# Patient Record
Sex: FEMALE | Race: OTHER
[De-identification: names, ages, dates, MRNs, and addresses within clinical notes are randomized per-mention and may not be internally consistent; named-entity substitution may affect disease eponyms.]

---

## 2021-03-02 LAB
ADD MANUAL DIFF: NO
ANION GAP SERPL CALC-SCNC: 11 MMOL/L (ref 5–15)
APPEARANCE UR: CLEAR
APTT BLD: 27 SEC (ref 23–33)
APTT PPP: (no result) S
BASOPHILS NFR BLD AUTO: 1.7 % (ref 0–2)
BUN SERPL-MCNC: 12 MG/DL (ref 7–18)
CALCIUM SERPL-MCNC: 9.5 MG/DL (ref 8.5–10.1)
CHLORIDE SERPL-SCNC: 103 MMOL/L (ref 98–107)
CO2 SERPL-SCNC: 25 MMOL/L (ref 21–32)
CREAT SERPL-MCNC: 0.7 MG/DL (ref 0.55–1.3)
EOSINOPHIL NFR BLD AUTO: 5.6 % (ref 0–3)
ERYTHROCYTE [DISTWIDTH] IN BLOOD BY AUTOMATED COUNT: 14 % (ref 11.6–14.8)
GLUCOSE UR STRIP-MCNC: NEGATIVE MG/DL
HCT VFR BLD CALC: 37.6 % (ref 37–47)
HGB BLD-MCNC: 11.6 G/DL (ref 12–16)
INR PPP: 0.9 (ref 0.9–1.1)
KETONES UR QL STRIP: NEGATIVE
LEUKOCYTE ESTERASE UR QL STRIP: (no result)
LYMPHOCYTES NFR BLD AUTO: 25.6 % (ref 20–45)
MCV RBC AUTO: 84 FL (ref 80–99)
MONOCYTES NFR BLD AUTO: 10.5 % (ref 1–10)
NEUTROPHILS NFR BLD AUTO: 56.6 % (ref 45–75)
NITRITE UR QL STRIP: NEGATIVE
PH UR STRIP: 5 [PH] (ref 4.5–8)
PLATELET # BLD: 393 K/UL (ref 150–450)
POTASSIUM SERPL-SCNC: 4 MMOL/L (ref 3.5–5.1)
PROT UR QL STRIP: NEGATIVE
RBC # BLD AUTO: 4.46 M/UL (ref 4.2–5.4)
SODIUM SERPL-SCNC: 139 MMOL/L (ref 136–145)
SP GR UR STRIP: 1.02 (ref 1–1.03)
UROBILINOGEN UR-MCNC: NORMAL MG/DL (ref 0–1)
WBC # BLD AUTO: 8.1 K/UL (ref 4.8–10.8)

## 2021-03-03 NOTE — PRE-OP HX & PHY REPO 2 SIG
DATE OF ADMISSION:  2021

SCHEDULED FOR OUTPATIENT SURGERY:  2021.



HISTORY OF PRESENT ILLNESS:  The patient is a 46-year-old female in overall

good health with ductal carcinoma in situ of the left breast.  The patient

underwent imaging studies revealing 1 cm abnormal calcifications in the

upper outer quadrant of the left breast.  Core biopsy revealed ductal

carcinoma in situ on 2021.  Repeat core biopsy ,

revealed no malignancy, attempting to biopsy additional isolated

microcalcifications.  Subsequently, the patient underwent an MRI revealing

two biopsy clips, one at 12 o'clock, one at 2 o'clock in the left breast

upper outer quadrant 9 cm from the nipple, two abnormal areas adjacent.

The patient is scheduled to undergo left breast partial mastectomy with

preoperative stereotactic needle localization x2.



PAST MEDICAL HISTORY/MEDICATIONS:  In the past for toe fungus.



ALLERGIES:  None.



OPERATIONS:  Removal of cyst of back.



REVIEW OF SYSTEMS:  She is  1, para 1.  She has regular menstrual

periods.



PHYSICAL EXAMINATION:

GENERAL:  The patient is 5 feet 5 inches, 193 pounds.

VITAL SIGNS:  Within normal limits.

HEENT:  Within normal limits.

LUNGS:  Clear.

HEART:  Regular rhythm.

BREASTS:  Large and ptotic.  There is no palpable mass in either breast.

No primary or secondary sign of tumor.  No axillary or supraclavicular

lymphadenopathy.

ABDOMEN:  Soft.

PELVIC AND RECTAL:  Per primary care.

EXTREMITIES:  Without edema.

NEUROLOGIC:  Physiologic.



IMPRESSION:  Ductal carcinoma in situ, left breast, two adjacent abnormal

lesions.



PLAN:  Left breast partial mastectomy with preoperative needle localization x 2



DISCUSSION:  I have had a full discussion with the patient regarding the

nature of her condition, the nature of the surgery, indications,

alternatives, options, and risks including bleeding, infection, distortion

of the breast or nipple and need for additional treatments including

radiation to the breast and possible endocrine therapy and if invasive

carcinoma is found then she would need lymph node biopsy and possible

additional treatments.  She understands and agrees to proceed.









  ______________________________________________

  Austin Presley M.D. DR:  DEMOND/VICENTE

D:  2021 09:59

T:  2021 11:48

JOB#:  [id number]

CC:



CESAR

## 2021-03-05 ENCOUNTER — HOSPITAL ENCOUNTER (OUTPATIENT)
Dept: HOSPITAL 72 - SUR | Age: 46
Discharge: HOME | End: 2021-03-05
Payer: MEDICAID

## 2021-03-05 VITALS — SYSTOLIC BLOOD PRESSURE: 98 MMHG | DIASTOLIC BLOOD PRESSURE: 40 MMHG

## 2021-03-05 VITALS — DIASTOLIC BLOOD PRESSURE: 54 MMHG | SYSTOLIC BLOOD PRESSURE: 93 MMHG

## 2021-03-05 VITALS — SYSTOLIC BLOOD PRESSURE: 128 MMHG | DIASTOLIC BLOOD PRESSURE: 80 MMHG

## 2021-03-05 VITALS — DIASTOLIC BLOOD PRESSURE: 79 MMHG | SYSTOLIC BLOOD PRESSURE: 136 MMHG

## 2021-03-05 VITALS — SYSTOLIC BLOOD PRESSURE: 99 MMHG | DIASTOLIC BLOOD PRESSURE: 43 MMHG

## 2021-03-05 VITALS — SYSTOLIC BLOOD PRESSURE: 129 MMHG | DIASTOLIC BLOOD PRESSURE: 76 MMHG

## 2021-03-05 VITALS — SYSTOLIC BLOOD PRESSURE: 91 MMHG | DIASTOLIC BLOOD PRESSURE: 44 MMHG

## 2021-03-05 VITALS — BODY MASS INDEX: 34.2 KG/M2 | WEIGHT: 193 LBS | HEIGHT: 63 IN

## 2021-03-05 VITALS — SYSTOLIC BLOOD PRESSURE: 131 MMHG | DIASTOLIC BLOOD PRESSURE: 72 MMHG

## 2021-03-05 VITALS — DIASTOLIC BLOOD PRESSURE: 77 MMHG | SYSTOLIC BLOOD PRESSURE: 132 MMHG

## 2021-03-05 VITALS — SYSTOLIC BLOOD PRESSURE: 122 MMHG | DIASTOLIC BLOOD PRESSURE: 74 MMHG

## 2021-03-05 VITALS — DIASTOLIC BLOOD PRESSURE: 60 MMHG | SYSTOLIC BLOOD PRESSURE: 106 MMHG

## 2021-03-05 VITALS — SYSTOLIC BLOOD PRESSURE: 118 MMHG | DIASTOLIC BLOOD PRESSURE: 68 MMHG

## 2021-03-05 DIAGNOSIS — E66.9: ICD-10-CM

## 2021-03-05 DIAGNOSIS — D05.12: Primary | ICD-10-CM

## 2021-03-05 PROCEDURE — 19301 PARTIAL MASTECTOMY: CPT

## 2021-03-05 PROCEDURE — 94150 VITAL CAPACITY TEST: CPT

## 2021-03-05 PROCEDURE — 81025 URINE PREGNANCY TEST: CPT

## 2021-03-05 PROCEDURE — 94003 VENT MGMT INPAT SUBQ DAY: CPT

## 2021-03-05 PROCEDURE — 93005 ELECTROCARDIOGRAM TRACING: CPT

## 2021-03-05 PROCEDURE — 85610 PROTHROMBIN TIME: CPT

## 2021-03-05 PROCEDURE — 85730 THROMBOPLASTIN TIME PARTIAL: CPT

## 2021-03-05 PROCEDURE — 81001 URINALYSIS AUTO W/SCOPE: CPT

## 2021-03-05 PROCEDURE — 85025 COMPLETE CBC W/AUTO DIFF WBC: CPT

## 2021-03-05 PROCEDURE — 80048 BASIC METABOLIC PNL TOTAL CA: CPT

## 2021-03-05 PROCEDURE — 36415 COLL VENOUS BLD VENIPUNCTURE: CPT

## 2021-03-05 NOTE — ANETHESIA PREOPERATIVE EVAL
Anesthesia Pre-op PMH/ROS


General


Date of Evaluation:  Mar 5, 2021


Anesthesiologist:  [place]


ASA Score:  ASA 3


Mallampati Score


Class I : Soft palate, uvula, fauces, pillars visible


Class II: Soft palate, uvula, fauces visible


Class III: Soft palate, base of uvula visible


Class IV: Only hard plate visible


Mallampati Classification:  Class II


Surgeon:  stefania


Diagnosis:  left breast cancer


Surgical Procedure:  left breast partial mastectomy


Anesthesia History:  none


Family History:  no anesthesia problems


Allergies:  


Coded Allergies:  


     No Known Allergies (Unverified , 3/4/21)


Medications:  see eMAR


Patient NPO?:  Yes


NPO Date:  Mar 5, 2021


NPO Time:  00:00





Past Medical History


Cardiovascular:  Denies: HTN, CAD, MI, valve dz, arrhythmia, other


Pulmonary:  Denies: asthma, COPD, PANCHITO, other


Gastrointestinal/Genitourinary:  Denies: GERD, CRI, ESRD, other


Neurologic/Psychiatric:  Denies: dementia, CVA, depression/anxiety, TIA, other


Endocrine:  Reports: other - left breast cancer; 


   Denies: DM, hypothyroidism, steroids


HEENT:  Denies: cataract (L), cataract (R), glaucoma, [place] IRA (L), [place] IRA (R), other


Hematology/Immune:  Denies: anemia, DVT, bleeding disorder, other


Musculoskeletal/Integumentary:  Denies: OA, RA, DJD, DDD, edema, other


Other:  obesity


PSxH Narrative:


denies





Anesthesia Pre-op Phys. Exam


Physician Exam





Last Vital Signs








  Date Time  Temp Pulse Resp B/P (MAP) Pulse Ox O2 Delivery O2 Flow Rate FiO2


 


3/5/21 08:58 97.2 67 18 118/68 99 Room Air  








Constitutional:  NAD


Cardiovascular:  RRR


Respiratory:  CTA





Airway Exam


Mallampati Score:  Class II


MO:  full


Neck:  short, obese


ROM:  full


Teeth:  intact





Anesthesia Pre-op A/P


Labs


see chart


Urine Pregnancy Test











Test


 3/5/21


08:30


 


Urine HCG, Qualitative Pending  











Studies


Pre-op Studies:  EKG - sr





Risk Assessment & Plan


Assessment:


asa iii


Plan:


ga


Status Change Before Surgery:  No





Pre-Antibiotics


Drug:  ancef 2g


Given Within 1 Hr of Incision:  Yes











Nelly Gomez MD                Mar 5, 2021 09:09

## 2021-03-05 NOTE — BRIEF OPERATIVE NOTE
Immediate Post Operative Note


Operative Note


Pre-op Diagnosis:


ductal carcinoma in situ left breast


Procedure:


left breast partial mastectomy with pre-operative needle localization


Post-op Diagnosis:


same


Post-op Diagnosis:  same as pre-op


Findings:  consistent w/pre-op dx studies


Surgeon:  stefania


Anesthesiologist:  [place]


Specimen:  yes - right breast tissue


Complications:  none


Condition:  stable


Fluids:  see anesthesia record


Estimated Blood Loss:  minimal


Drains:  none


Implant(s) used?:  No











Austin Presley MD              Mar 5, 2021 10:34

## 2021-03-05 NOTE — 48 HOUR POST ANESTHESIA EVAL
Post Anesthesia Evaluation


Procedure:  left partial breast mastectomy


Date of Evaluation:  Mar 5, 2021


Airway:  patent


Nausea:  No


Vomiting:  No


Pain Intensity:  0


Hydration Status:  adequate


Cardiopulmonary Status:


at baseline


Mental Status/LOC:  patient returned to baseline


Post-Anesthesia Complications:


0


Follow-up care needed:  ready to discharge











Nelly Gomez MD                Mar 5, 2021 10:40

## 2021-03-05 NOTE — PRE-PROCEDURE NOTE/ATTESTATION
Pre-Procedure Note/Attestation


Complete Prior to Procedure


Planned Procedure:  left


Procedure Narrative:


left breast partial mastectomy with pre-operative needle localization





Indications for Procedure


Pre-Operative Diagnosis:


ductal carcinoma in situ left breast





Attestation


I attest that I discussed the nature of the procedure; its benefits; risks and 

complications; and alternatives (and the risks and benefits of such 

alternatives), prior to the procedure, with the patient (or the patient's legal 

representative).





I attest that, if there was a reasonable possibility of needing a blood 

transfusion, the patient (or the patient's legal representative) was given the 

[facility] standardized written summary, pursuant 

to the [place] Blood Safety Act ([place] Health and Safety Code # 1645, as 

amended).





I attest that I re-evaluated the patient just prior to the surgery and that 

there has been no change in the patient's H&P, except as documented below: none











Austin Presley MD              Mar 5, 2021 08:52

## 2021-03-05 NOTE — OPERATIVE NOTE - DICTATED
DATE OF OPERATION:  03/05/2021

SURGEON:  Austin Presley MD.



ASSISTANT:  None.



ANESTHESIOLOGIST:  Dr. Gomez.



TYPE OF ANESTHESIA:  General.



PREOPERATIVE DIAGNOSIS:  Ductal carcinoma in situ, left breast.



POSTOPERATIVE DIAGNOSIS:  Ductal carcinoma in situ, left breast.



OPERATION PERFORMED:  Left breast partial mastectomy with preoperative

needle localization.



DESCRIPTION OF PROCEDURE:  The patient was taken to the operating room and

under general anesthesia with sequential compression device stockings in

place, she was prepped and draped in usual fashion.  The lesion was

located in the left breast upper outer quadrant 9 cm from the nipple

involving a cluster of calcifications.  An outer breast curvilinear

incision was made achieving hemostasis with cautery and flaps dissected

circumferentially with the wire brought into the field.  The appropriate

quadrant of breast tissue was resected achieving hemostasis with cautery.

The specimen was oriented with sutures placed anterior, superior, and

medial.  Specimen radiograph was obtained, which confirmed that the lesion

and marking clip was included within the tissue removed.  The field was

irrigated with sterile water followed by antibiotic solution and

hemostasis carefully achieved with cautery.  The incision was closed with

interrupted 3-0 Vicryl deep dermal subcutaneous sutures followed by

continuous 4-0 Monocryl subcuticular suture.  Tincture of benzoin and

half-inch Steri-Strips were applied followed by dry sterile dressing.

Final sponge and needle counts were correct.  The patient tolerated the

procedure well and left the operating room in good condition.









  ______________________________________________

  Austin Presley M.D.





DR:  BAN

D:  03/05/2021 10:39

T:  03/05/2021 11:13

JOB#:  [id number]

CC:

## 2021-03-05 NOTE — IMMEDIATE POST-OP EVALUATION
Immediate Post-Op Evalulation


Immediate Post-Op Evalulation


Procedure:  left partial breast mastectomy


Date of Evaluation:  Mar 5, 2021


Time of Evaluation:  10:40


IV Fluids:  600


Blood Products:  0


Estimated Blood Loss:  min


Urinary Output:  0


Blood Pressure Systolic:  91


Blood Pressure Diastolic:  44


Pulse Rate:  85


Respiratory Rate:  16


O2 Sat by Pulse Oximetry:  98


Temperature (Fahrenheit):  98.3


Pain Score (1-10):  0


Nausea:  No


Vomiting:  No


Complications


0


Patient Status:  awake, reacts, patent, none


Hydration Status:  adequate


Drug:  Ancef 2g


Given Within 1 Hr of Incision:  Yes











Nelly Gomez MD                Mar 5, 2021 10:38